# Patient Record
Sex: MALE | Race: ASIAN | NOT HISPANIC OR LATINO
[De-identification: names, ages, dates, MRNs, and addresses within clinical notes are randomized per-mention and may not be internally consistent; named-entity substitution may affect disease eponyms.]

---

## 2017-07-28 PROBLEM — Z00.00 ENCOUNTER FOR PREVENTIVE HEALTH EXAMINATION: Status: ACTIVE | Noted: 2017-07-28

## 2017-08-01 ENCOUNTER — APPOINTMENT (OUTPATIENT)
Dept: UROLOGY | Facility: CLINIC | Age: 82
End: 2017-08-01
Payer: MEDICARE

## 2017-08-01 VITALS
WEIGHT: 126 LBS | BODY MASS INDEX: 18.66 KG/M2 | TEMPERATURE: 97.5 F | OXYGEN SATURATION: 97 % | DIASTOLIC BLOOD PRESSURE: 62 MMHG | SYSTOLIC BLOOD PRESSURE: 151 MMHG | HEART RATE: 58 BPM | HEIGHT: 69 IN

## 2017-08-01 DIAGNOSIS — Z87.19 PERSONAL HISTORY OF OTHER DISEASES OF THE DIGESTIVE SYSTEM: ICD-10-CM

## 2017-08-01 DIAGNOSIS — Z86.39 PERSONAL HISTORY OF OTHER ENDOCRINE, NUTRITIONAL AND METABOLIC DISEASE: ICD-10-CM

## 2017-08-01 DIAGNOSIS — Z86.79 PERSONAL HISTORY OF OTHER DISEASES OF THE CIRCULATORY SYSTEM: ICD-10-CM

## 2017-08-01 DIAGNOSIS — Z78.9 OTHER SPECIFIED HEALTH STATUS: ICD-10-CM

## 2017-08-01 DIAGNOSIS — I10 ESSENTIAL (PRIMARY) HYPERTENSION: ICD-10-CM

## 2017-08-01 PROCEDURE — 99204 OFFICE O/P NEW MOD 45 MIN: CPT

## 2017-08-01 RX ORDER — TAMSULOSIN HYDROCHLORIDE 0.4 MG/1
CAPSULE ORAL
Refills: 0 | Status: ACTIVE | COMMUNITY

## 2017-08-01 RX ORDER — SENNOSIDES 8.6 MG/1
TABLET ORAL
Refills: 0 | Status: ACTIVE | COMMUNITY

## 2017-08-01 RX ORDER — MULTIVIT-MIN/IRON FUM/FOLIC AC 7.5 MG-4
TABLET ORAL
Refills: 0 | Status: ACTIVE | COMMUNITY

## 2017-08-01 RX ORDER — METOPROLOL TARTRATE 75 MG/1
TABLET, FILM COATED ORAL
Refills: 0 | Status: ACTIVE | COMMUNITY

## 2017-08-01 RX ORDER — PRAVASTATIN SODIUM 80 MG/1
TABLET ORAL
Refills: 0 | Status: ACTIVE | COMMUNITY

## 2017-08-01 RX ORDER — AMLODIPINE BESYLATE 5 MG/1
TABLET ORAL
Refills: 0 | Status: ACTIVE | COMMUNITY

## 2017-08-01 RX ORDER — FINASTERIDE 1 MG/1
TABLET ORAL
Refills: 0 | Status: ACTIVE | COMMUNITY

## 2017-08-01 RX ORDER — LOSARTAN POTASSIUM 100 MG/1
TABLET, FILM COATED ORAL
Refills: 0 | Status: ACTIVE | COMMUNITY

## 2017-08-03 ENCOUNTER — APPOINTMENT (OUTPATIENT)
Age: 82
End: 2017-08-03

## 2017-08-03 LAB
APPEARANCE: CLEAR
BACTERIA: NEGATIVE
BILIRUBIN URINE: NEGATIVE
BLOOD URINE: ABNORMAL
COLOR: YELLOW
GLUCOSE QUALITATIVE U: NORMAL MG/DL
HYALINE CASTS: 0 /LPF
KETONES URINE: NEGATIVE
LEUKOCYTE ESTERASE URINE: NEGATIVE
MICROSCOPIC-UA: NORMAL
NITRITE URINE: NEGATIVE
PH URINE: 6.5
PROTEIN URINE: NEGATIVE MG/DL
RED BLOOD CELLS URINE: 10 /HPF
SPECIFIC GRAVITY URINE: 1.02
SQUAMOUS EPITHELIAL CELLS: 0 /HPF
UROBILINOGEN URINE: 1 MG/DL
WHITE BLOOD CELLS URINE: 0 /HPF

## 2017-08-07 ENCOUNTER — APPOINTMENT (OUTPATIENT)
Dept: UROLOGY | Facility: CLINIC | Age: 82
End: 2017-08-07
Payer: MEDICARE

## 2017-08-07 VITALS
DIASTOLIC BLOOD PRESSURE: 69 MMHG | HEART RATE: 72 BPM | RESPIRATION RATE: 17 BRPM | TEMPERATURE: 97.6 F | WEIGHT: 128 LBS | BODY MASS INDEX: 18.9 KG/M2 | SYSTOLIC BLOOD PRESSURE: 154 MMHG | OXYGEN SATURATION: 99 %

## 2017-08-07 PROCEDURE — 52000 CYSTOURETHROSCOPY: CPT

## 2018-03-19 ENCOUNTER — APPOINTMENT (OUTPATIENT)
Dept: UROLOGY | Facility: CLINIC | Age: 83
End: 2018-03-19
Payer: MEDICARE

## 2018-03-19 VITALS
DIASTOLIC BLOOD PRESSURE: 79 MMHG | RESPIRATION RATE: 18 BRPM | BODY MASS INDEX: 19.2 KG/M2 | SYSTOLIC BLOOD PRESSURE: 144 MMHG | WEIGHT: 130 LBS | HEART RATE: 67 BPM | OXYGEN SATURATION: 98 %

## 2018-03-19 PROCEDURE — 99213 OFFICE O/P EST LOW 20 MIN: CPT

## 2018-03-20 ENCOUNTER — APPOINTMENT (OUTPATIENT)
Age: 83
End: 2018-03-20

## 2018-03-20 LAB
APPEARANCE: CLEAR
BACTERIA: NEGATIVE
BILIRUBIN URINE: NEGATIVE
BLOOD URINE: NEGATIVE
CALCIUM OXALATE CRYSTALS: ABNORMAL
COLOR: YELLOW
GLUCOSE QUALITATIVE U: NEGATIVE MG/DL
HYALINE CASTS: 0 /LPF
KETONES URINE: NEGATIVE
LEUKOCYTE ESTERASE URINE: NEGATIVE
MICROSCOPIC-UA: NORMAL
NITRITE URINE: NEGATIVE
PH URINE: 5.5
PROTEIN URINE: NEGATIVE MG/DL
RED BLOOD CELLS URINE: 3 /HPF
SPECIFIC GRAVITY URINE: 1.03
SQUAMOUS EPITHELIAL CELLS: 1 /HPF
UROBILINOGEN URINE: NEGATIVE MG/DL
WHITE BLOOD CELLS URINE: 1 /HPF

## 2018-03-29 ENCOUNTER — APPOINTMENT (OUTPATIENT)
Dept: UROLOGY | Facility: CLINIC | Age: 83
End: 2018-03-29
Payer: MEDICARE

## 2018-03-29 PROCEDURE — 76775 US EXAM ABDO BACK WALL LIM: CPT

## 2019-03-28 ENCOUNTER — APPOINTMENT (OUTPATIENT)
Dept: UROLOGY | Facility: CLINIC | Age: 84
End: 2019-03-28
Payer: MEDICARE

## 2019-03-28 ENCOUNTER — APPOINTMENT (OUTPATIENT)
Dept: UROLOGY | Facility: CLINIC | Age: 84
End: 2019-03-28

## 2019-03-28 PROCEDURE — 76775 US EXAM ABDO BACK WALL LIM: CPT

## 2019-03-28 PROCEDURE — 99213 OFFICE O/P EST LOW 20 MIN: CPT | Mod: 25

## 2019-03-28 NOTE — ASSESSMENT
[FreeTextEntry1] : Patient is a 85 yo M who presents with microhematuria. 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  New small punctate R kidney stones.\par D/w pt and son that for small punctate asymptomatic stones would observe at this time.  Increase fluid intake and avoid salt, protein, oxalate.\par F/u 1 yr for repeat sono

## 2019-03-28 NOTE — HISTORY OF PRESENT ILLNESS
[FreeTextEntry1] : Patient is a 85 yo M who presents for f/u of microhematuria.  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  He has nocturia x2.  AUA SS 2 bother 0.  He denies dysuria, gross hematuria, urgency, frequency on his BPH meds.  He notes rare urge incontinence, leaks a few drops. No use of pads.  No flank pain.  No fever/chills.\par \par Here for f/u today.  Doing well.  No pain.  No hematuria.  Renal sono showed stable L AML, two small punctate R renal stones.

## 2019-10-07 ENCOUNTER — MEDICATION RENEWAL (OUTPATIENT)
Age: 84
End: 2019-10-07

## 2020-02-03 ENCOUNTER — APPOINTMENT (OUTPATIENT)
Dept: UROLOGY | Facility: CLINIC | Age: 85
End: 2020-02-03
Payer: MEDICARE

## 2020-02-03 VITALS
OXYGEN SATURATION: 96 % | SYSTOLIC BLOOD PRESSURE: 105 MMHG | HEART RATE: 59 BPM | BODY MASS INDEX: 18.16 KG/M2 | DIASTOLIC BLOOD PRESSURE: 65 MMHG | RESPIRATION RATE: 18 BRPM | WEIGHT: 123 LBS | TEMPERATURE: 98 F

## 2020-02-03 PROCEDURE — 99213 OFFICE O/P EST LOW 20 MIN: CPT | Mod: 25

## 2020-02-03 PROCEDURE — 76775 US EXAM ABDO BACK WALL LIM: CPT

## 2020-02-03 NOTE — ASSESSMENT
[FreeTextEntry1] : Patient is a 85 yo M who presents for f/u microhematuria. Stable BPH/LUTS on combination medication\par Prior renal ULT with stable AML and punctate stones\par \par -renal sono today \par -reassured pt that given cystoscopy in 2017, no need at this time for repeat cystoscopy\par -f/u 1 yr

## 2020-02-03 NOTE — PHYSICAL EXAM
[General Appearance - Well Developed] : well developed [Well Groomed] : well groomed [Normal Appearance] : normal appearance [General Appearance - Well Nourished] : well nourished [General Appearance - In No Acute Distress] : no acute distress [Abdomen Soft] : soft [Abdomen Tenderness] : non-tender [Costovertebral Angle Tenderness] : no ~M costovertebral angle tenderness [] : no respiratory distress [Respiration, Rhythm And Depth] : normal respiratory rhythm and effort [Exaggerated Use Of Accessory Muscles For Inspiration] : no accessory muscle use

## 2020-02-03 NOTE — HISTORY OF PRESENT ILLNESS
[FreeTextEntry1] : Patient is a 85 yo M who presents for f/u of microhematuria.  He had negative hematuria w/u with cystoscopy and renal sono showed small 7mm L AML in 2017.\par \par More recently he had annual labs with PCP - UA showed 2-5 rbcs, microhematuria.  He is doing stably, no new complaints.  He does have baseline LUTS/BPH, takes flomax, finasteride.  He has nocturia x2.  AUA SS 2 bother 0.  He denies dysuria, gross hematuria, urgency, frequency on his BPH meds.  He notes rare urge incontinence, leaks a few drops. No use of pads.  No flank pain.  No fever/chills.\par \par Prior renal sono showed stable L AML, two small punctate R renal stones in 3/2019.

## 2020-03-30 ENCOUNTER — APPOINTMENT (OUTPATIENT)
Dept: UROLOGY | Facility: CLINIC | Age: 85
End: 2020-03-30

## 2021-02-04 ENCOUNTER — APPOINTMENT (OUTPATIENT)
Dept: UROLOGY | Facility: CLINIC | Age: 86
End: 2021-02-04

## 2021-02-11 ENCOUNTER — APPOINTMENT (OUTPATIENT)
Dept: UROLOGY | Facility: CLINIC | Age: 86
End: 2021-02-11

## 2021-03-08 ENCOUNTER — APPOINTMENT (OUTPATIENT)
Dept: UROLOGY | Facility: CLINIC | Age: 86
End: 2021-03-08
Payer: MEDICARE

## 2021-03-08 VITALS — TEMPERATURE: 97.3 F

## 2021-03-08 PROCEDURE — 99072 ADDL SUPL MATRL&STAF TM PHE: CPT

## 2021-03-08 PROCEDURE — 76775 US EXAM ABDO BACK WALL LIM: CPT

## 2021-11-11 DIAGNOSIS — N02.7: ICD-10-CM

## 2021-11-11 RX ORDER — RIVAROXABAN 20 MG/1
20 TABLET, FILM COATED ORAL
Refills: 0 | Status: ACTIVE | COMMUNITY
Start: 2021-11-11

## 2021-11-11 RX ORDER — WARFARIN SODIUM 6 MG/1
TABLET ORAL
Refills: 0 | Status: DISCONTINUED | COMMUNITY
End: 2021-11-11

## 2021-11-15 ENCOUNTER — APPOINTMENT (OUTPATIENT)
Dept: UROLOGY | Facility: CLINIC | Age: 86
End: 2021-11-15

## 2021-11-15 ENCOUNTER — APPOINTMENT (OUTPATIENT)
Dept: UROLOGY | Facility: CLINIC | Age: 86
End: 2021-11-15
Payer: MEDICARE

## 2021-11-15 VITALS
RESPIRATION RATE: 18 BRPM | SYSTOLIC BLOOD PRESSURE: 107 MMHG | HEART RATE: 85 BPM | OXYGEN SATURATION: 98 % | WEIGHT: 110 LBS | BODY MASS INDEX: 16.24 KG/M2 | DIASTOLIC BLOOD PRESSURE: 66 MMHG | TEMPERATURE: 97.6 F

## 2021-11-15 LAB
APPEARANCE: ABNORMAL
BACTERIA UR CULT: NORMAL
BACTERIA: NEGATIVE
BILIRUBIN URINE: ABNORMAL
BLOOD URINE: ABNORMAL
CALCIUM OXALATE CRYSTALS: ABNORMAL
COLOR: ABNORMAL
GLUCOSE QUALITATIVE U: ABNORMAL
KETONES URINE: ABNORMAL
LEUKOCYTE ESTERASE URINE: ABNORMAL
MICROSCOPIC-UA: NORMAL
NITRITE URINE: ABNORMAL
PH URINE: ABNORMAL
PROTEIN URINE: ABNORMAL
RED BLOOD CELLS URINE: >720 /HPF
SPECIFIC GRAVITY URINE: ABNORMAL
SQUAMOUS EPITHELIAL CELLS: 0 /HPF
UROBILINOGEN URINE: ABNORMAL
WHITE BLOOD CELLS URINE: 5 /HPF

## 2021-11-15 PROCEDURE — 52000 CYSTOURETHROSCOPY: CPT

## 2021-11-16 ENCOUNTER — APPOINTMENT (OUTPATIENT)
Age: 86
End: 2021-11-16

## 2021-11-16 LAB — BACTERIA UR CULT: NORMAL

## 2021-11-22 ENCOUNTER — APPOINTMENT (OUTPATIENT)
Age: 86
End: 2021-11-22

## 2021-11-22 LAB — URINE CYTOLOGY: NORMAL

## 2021-12-06 ENCOUNTER — NON-APPOINTMENT (OUTPATIENT)
Age: 86
End: 2021-12-06

## 2022-01-07 ENCOUNTER — OUTPATIENT (OUTPATIENT)
Dept: OUTPATIENT SERVICES | Facility: HOSPITAL | Age: 87
LOS: 1 days | End: 2022-01-07
Payer: COMMERCIAL

## 2022-01-07 VITALS
TEMPERATURE: 97 F | DIASTOLIC BLOOD PRESSURE: 64 MMHG | SYSTOLIC BLOOD PRESSURE: 99 MMHG | HEART RATE: 67 BPM | WEIGHT: 108.91 LBS | HEIGHT: 65.75 IN | OXYGEN SATURATION: 98 % | RESPIRATION RATE: 18 BRPM

## 2022-01-07 DIAGNOSIS — Z95.0 PRESENCE OF CARDIAC PACEMAKER: Chronic | ICD-10-CM

## 2022-01-07 DIAGNOSIS — Z98.890 OTHER SPECIFIED POSTPROCEDURAL STATES: Chronic | ICD-10-CM

## 2022-01-07 DIAGNOSIS — I10 ESSENTIAL (PRIMARY) HYPERTENSION: ICD-10-CM

## 2022-01-07 DIAGNOSIS — I50.9 HEART FAILURE, UNSPECIFIED: ICD-10-CM

## 2022-01-07 DIAGNOSIS — Z01.818 ENCOUNTER FOR OTHER PREPROCEDURAL EXAMINATION: ICD-10-CM

## 2022-01-07 DIAGNOSIS — Z90.89 ACQUIRED ABSENCE OF OTHER ORGANS: Chronic | ICD-10-CM

## 2022-01-07 DIAGNOSIS — R31.9 HEMATURIA, UNSPECIFIED: ICD-10-CM

## 2022-01-07 DIAGNOSIS — I48.91 UNSPECIFIED ATRIAL FIBRILLATION: ICD-10-CM

## 2022-01-07 LAB
ANION GAP SERPL CALC-SCNC: 12 MMOL/L — SIGNIFICANT CHANGE UP (ref 5–17)
BUN SERPL-MCNC: 22 MG/DL — SIGNIFICANT CHANGE UP (ref 7–23)
CALCIUM SERPL-MCNC: 9.1 MG/DL — SIGNIFICANT CHANGE UP (ref 8.4–10.5)
CHLORIDE SERPL-SCNC: 105 MMOL/L — SIGNIFICANT CHANGE UP (ref 96–108)
CO2 SERPL-SCNC: 22 MMOL/L — SIGNIFICANT CHANGE UP (ref 22–31)
CREAT SERPL-MCNC: 1.08 MG/DL — SIGNIFICANT CHANGE UP (ref 0.5–1.3)
GLUCOSE SERPL-MCNC: 118 MG/DL — HIGH (ref 70–99)
HCT VFR BLD CALC: 32 % — LOW (ref 39–50)
HGB BLD-MCNC: 10 G/DL — LOW (ref 13–17)
MCHC RBC-ENTMCNC: 28.7 PG — SIGNIFICANT CHANGE UP (ref 27–34)
MCHC RBC-ENTMCNC: 31.3 GM/DL — LOW (ref 32–36)
MCV RBC AUTO: 91.7 FL — SIGNIFICANT CHANGE UP (ref 80–100)
NRBC # BLD: 0 /100 WBCS — SIGNIFICANT CHANGE UP (ref 0–0)
PLATELET # BLD AUTO: 168 K/UL — SIGNIFICANT CHANGE UP (ref 150–400)
POTASSIUM SERPL-MCNC: 4 MMOL/L — SIGNIFICANT CHANGE UP (ref 3.5–5.3)
POTASSIUM SERPL-SCNC: 4 MMOL/L — SIGNIFICANT CHANGE UP (ref 3.5–5.3)
RBC # BLD: 3.49 M/UL — LOW (ref 4.2–5.8)
RBC # FLD: 14.6 % — HIGH (ref 10.3–14.5)
SODIUM SERPL-SCNC: 139 MMOL/L — SIGNIFICANT CHANGE UP (ref 135–145)
WBC # BLD: 4.43 K/UL — SIGNIFICANT CHANGE UP (ref 3.8–10.5)
WBC # FLD AUTO: 4.43 K/UL — SIGNIFICANT CHANGE UP (ref 3.8–10.5)

## 2022-01-07 PROCEDURE — 85027 COMPLETE CBC AUTOMATED: CPT

## 2022-01-07 PROCEDURE — 87186 SC STD MICRODIL/AGAR DIL: CPT

## 2022-01-07 PROCEDURE — 80048 BASIC METABOLIC PNL TOTAL CA: CPT

## 2022-01-07 PROCEDURE — G0463: CPT

## 2022-01-07 PROCEDURE — 87077 CULTURE AEROBIC IDENTIFY: CPT

## 2022-01-07 PROCEDURE — 87086 URINE CULTURE/COLONY COUNT: CPT

## 2022-01-07 RX ORDER — LIDOCAINE HCL 20 MG/ML
0.2 VIAL (ML) INJECTION ONCE
Refills: 0 | Status: DISCONTINUED | OUTPATIENT
Start: 2022-02-02 | End: 2022-02-17

## 2022-01-07 RX ORDER — CEFAZOLIN SODIUM 1 G
2000 VIAL (EA) INJECTION ONCE
Refills: 0 | Status: DISCONTINUED | OUTPATIENT
Start: 2022-02-02 | End: 2022-02-17

## 2022-01-07 RX ORDER — SACUBITRIL AND VALSARTAN 24; 26 MG/1; MG/1
1 TABLET, FILM COATED ORAL
Qty: 0 | Refills: 0 | DISCHARGE

## 2022-01-07 RX ORDER — SODIUM CHLORIDE 9 MG/ML
3 INJECTION INTRAMUSCULAR; INTRAVENOUS; SUBCUTANEOUS EVERY 8 HOURS
Refills: 0 | Status: DISCONTINUED | OUTPATIENT
Start: 2022-02-02 | End: 2022-02-17

## 2022-01-07 NOTE — H&P PST ADULT - PROBLEM SELECTOR PLAN 2
take Entresto with sip of water in the am of the surgery   ECHO and cardiac clearance to be done on 1/09/22 to be faxed to PST

## 2022-01-07 NOTE — H&P PST ADULT - PROBLEM SELECTOR PLAN 1
for cystoscopy and TURBT  CBC BMP urine culture sent to lab  preprocedure instructions given to patient and son   cardiac clearance to be done on 1/9 Sunday with ECHO  PCP medical clearance on chart   COVID swab on 1/9 sunday

## 2022-01-07 NOTE — H&P PST ADULT - PSYCHIATRIC COMMENTS
son states patient is confused but difficult to tell as  phone does not help, has been on aricept in the past but not now

## 2022-01-07 NOTE — H&P PST ADULT - NSICDXPASTMEDICALHX_GEN_ALL_CORE_FT
PAST MEDICAL HISTORY:  Angiomyolipoma of kidney     Atrial fibrillation     BEP (benign enlargement of prostate)     GIB (gastrointestinal bleeding) hx of due to hemmorhoids    Hematuria     History of subdural hematoma s/p evacuation    Hyperlipidemia     Hypertension     Mitral regurgitation s/p annuloplasty    Right nephrolithiasis     Tonsil cancer s/p tonsillectomy, chemo, radiation

## 2022-01-07 NOTE — H&P PST ADULT - ADDITIONAL PE
Son present during the interview as patient speaks Cantonese only and is very [place] and a poor historian needs assistance from son for safety

## 2022-01-07 NOTE — H&P PST ADULT - PROBLEM SELECTOR PLAN 3
hold Xarelto 3 days prior to surgery  spoke with Dr Mcnamara cardiologist who is agreeable to holding Xarelto for 3 days prior to surgery, last dose 1/8/22  take propanolol on am of procedure with sip of water  last Pacemaker interrogation July 13, 2021  [employer] [id number] with 4 years battery life copy on chart

## 2022-01-07 NOTE — H&P PST ADULT - NSICDXPASTSURGICALHX_GEN_ALL_CORE_FT
PAST SURGICAL HISTORY:  Cardiac pacemaker first implant was MDT s/p gen change  BSC SCHUYLER    S/P tonsillectomy     Status post mitral valve annuloplasty

## 2022-01-07 NOTE — H&P PST ADULT - HISTORY OF PRESENT ILLNESS
HTN, HLD, AFIB, Mitral valve disease, s/p MV annuloplasty, Afib s/p single chamber [employer] ppm, s/p subdural hematoma tonsil cancer 2019, s/p tonsillectomy chemotherapy radiation no teeth, GIB hemorrhoids  hx of exploratory lap,  angiomyolipoma of the kidney, BPH with nocturia, right kidney nephrolithiasis,  This is a elderly frail 87 year old [facility] Cantonese speaking male with a past medical history HTN, HLD, Mitral regurgitation s/p MV annuloplasty, Afib on Xarelto s/p [place] scientific PPM  2014, HF unknown EF son states it has improved to normal on entresto, s/p subdural hematoma s/p evacuation 2017, tonsil cancer 2019, s/p tonsillectomy chemotherapy radiation no teeth, GIB hemorrhoids 2018.   angiomyolipoma of the kidney, BPH with nocturia, right kidney nephrolithiasis. Patient with history of ongoing hematuria, imaging with bladder lesion and thickening noted present to PST prior to cystoscopy and transurethral resection of bladder tumor on 1/13/22. denies fever cough, malaise body aches, dizziness, HA, palpitations, chest pain , SOB GILLIS, abdominal pain, N/V/D, states hematuria has stopped.   COVID swab due on 1/9/22 11:30 am This is a elderly frail 87 year old [facility] Cantonese speaking male with a past medical history HTN, HLD, Mitral regurgitation s/p MV annuloplasty, Afib on Xarelto s/p [place] scientific PPM  2014, HF unknown EF son states it has improved to normal on entresto, s/p subdural hematoma s/p evacuation 2017, tonsil cancer 2019, s/p tonsillectomy chemotherapy radiation no teeth, GIB hemorrhoids 2018.   angiomyolipoma of the kidney, BPH with nocturia, right kidney nephrolithiasis. Patient with history of ongoing hematuria, imaging with bladder lesion and thickening noted present to PST prior to cystoscopy and transurethral resection of bladder tumor on 1/13/22. denies fever cough, malaise body aches, dizziness, HA, palpitations, chest pain , SOB GILLIS, abdominal pain, N/V/D, states hematuria has stopped.   COVID swab due on 1/9/22 11:30 am    ****Procedure was canceled due to positive urine cx, patient was treated, rescheduled for 2/2/2022 ROGER Rachel NP *** This is a elderly frail 87 year old [facility] Cantonese speaking male with a past medical history HTN, HLD, Mitral regurgitation s/p MV annuloplasty, Afib on Xarelto s/p [place] scientific PPM  2014, HF unknown EF son states it has improved to normal on entresto, s/p subdural hematoma s/p evacuation 2017, tonsil cancer 2019, s/p tonsillectomy chemotherapy radiation no teeth, GIB hemorrhoids 2018.   angiomyolipoma of the kidney, BPH with nocturia, right kidney nephrolithiasis. Patient with history of ongoing hematuria, imaging with bladder lesion and thickening noted present to PST prior to cystoscopy and transurethral resection of bladder tumor on 1/13/22. denies fever cough, malaise body aches, dizziness, HA, palpitations, chest pain , SOB GILLIS, abdominal pain, N/V/D, states hematuria has stopped.   COVID swab due on 1/9/22 11:30 am    ****Procedure was canceled due to positive urine cx, patient was treated, rescheduled for 2/2/2022 ROGER Rachel NP ***     As per Dr. Potter   " TURBT should be done in the main OR given his mod-severe valvular disease with poor functional status/ worsening SOB, pacemaker dependence, dementia and possible difficult airway given his h/o tonsillar cancer with radiation. " Case re directed 1/31/2022 ROGER Rachel NP  This is a elderly frail 87 year old [facility] Cantonese speaking male with a past medical history HTN, HLD, Mitral regurgitation s/p MV annuloplasty, Afib on Xarelto s/p [place] scientific PPM  2014, HF unknown EF son states it has improved to normal on entresto, s/p subdural hematoma s/p evacuation 2017, tonsil cancer 2019, s/p tonsillectomy chemotherapy radiation no teeth, GIB hemorrhoids 2018.   angiomyolipoma of the kidney, BPH with nocturia, right kidney nephrolithiasis. Patient with history of ongoing hematuria, imaging with bladder lesion and thickening noted present to PST prior to cystoscopy and transurethral resection of bladder tumor on 1/13/22. denies fever cough, malaise body aches, dizziness, HA, palpitations, chest pain , SOB GILLIS, abdominal pain, N/V/D, states hematuria has stopped.   COVID swab due on 1/9/22 11:30 am    ****Procedure was canceled due to positive urine cx, patient was treated, rescheduled for 2/2/2022 ROGER Rachel NP ***     As per Dr. Potter   " TURBT should be done in the main OR given his mod-severe valvular disease with poor functional status/ worsening SOB, pacemaker dependence, dementia and possible difficult airway given his h/o tonsillar cancer with radiation. " Case re directed, team and surgeon notified via email  1/31/2022 ROGER Rachel NP

## 2022-01-07 NOTE — H&P PST ADULT - FALL HARM RISK - HARM RISK INTERVENTIONS
Assistance with ambulation/Assistance OOB with selected safe patient handling equipment/Communicate Risk of Fall with Harm to all staff/Discuss with provider need for PT consult/Monitor gait and stability/Provide patient with walking aids - walker, cane, crutches/Reinforce activity limits and safety measures with patient and family/Tailored Fall Risk Interventions/Use of alarms - bed, chair and/or voice tab/Visual Cue: Yellow wristband and red socks/Bed in lowest position, wheels locked, appropriate side rails in place/Call bell, personal items and telephone in reach/Instruct patient to call for assistance before getting out of bed or chair/Non-slip footwear when patient is out of bed/[place] to call system/Physically safe environment - no spills, clutter or unnecessary equipment/Purposeful Proactive Rounding/Room/bathroom lighting operational, light cord in reach

## 2022-01-09 ENCOUNTER — OUTPATIENT (OUTPATIENT)
Dept: OUTPATIENT SERVICES | Facility: HOSPITAL | Age: 87
LOS: 1 days | End: 2022-01-09
Payer: COMMERCIAL

## 2022-01-09 DIAGNOSIS — Z98.890 OTHER SPECIFIED POSTPROCEDURAL STATES: Chronic | ICD-10-CM

## 2022-01-09 DIAGNOSIS — Z90.89 ACQUIRED ABSENCE OF OTHER ORGANS: Chronic | ICD-10-CM

## 2022-01-09 DIAGNOSIS — Z11.52 ENCOUNTER FOR SCREENING FOR COVID-19: ICD-10-CM

## 2022-01-09 DIAGNOSIS — Z95.0 PRESENCE OF CARDIAC PACEMAKER: Chronic | ICD-10-CM

## 2022-01-09 PROCEDURE — U0003: CPT

## 2022-01-09 PROCEDURE — C9803: CPT

## 2022-01-09 PROCEDURE — U0005: CPT

## 2022-01-10 LAB
-  AMIKACIN: SIGNIFICANT CHANGE UP
-  AMOXICILLIN/CLAVULANIC ACID: SIGNIFICANT CHANGE UP
-  AMPICILLIN/SULBACTAM: SIGNIFICANT CHANGE UP
-  AMPICILLIN: SIGNIFICANT CHANGE UP
-  AZTREONAM: SIGNIFICANT CHANGE UP
-  CEFAZOLIN: SIGNIFICANT CHANGE UP
-  CEFEPIME: SIGNIFICANT CHANGE UP
-  CEFOXITIN: SIGNIFICANT CHANGE UP
-  CEFTRIAXONE: SIGNIFICANT CHANGE UP
-  CIPROFLOXACIN: SIGNIFICANT CHANGE UP
-  ERTAPENEM: SIGNIFICANT CHANGE UP
-  GENTAMICIN: SIGNIFICANT CHANGE UP
-  IMIPENEM: SIGNIFICANT CHANGE UP
-  LEVOFLOXACIN: SIGNIFICANT CHANGE UP
-  MEROPENEM: SIGNIFICANT CHANGE UP
-  NITROFURANTOIN: SIGNIFICANT CHANGE UP
-  PIPERACILLIN/TAZOBACTAM: SIGNIFICANT CHANGE UP
-  TIGECYCLINE: SIGNIFICANT CHANGE UP
-  TOBRAMYCIN: SIGNIFICANT CHANGE UP
-  TRIMETHOPRIM/SULFAMETHOXAZOLE: SIGNIFICANT CHANGE UP
CULTURE RESULTS: SIGNIFICANT CHANGE UP
METHOD TYPE: SIGNIFICANT CHANGE UP
ORGANISM # SPEC MICROSCOPIC CNT: SIGNIFICANT CHANGE UP
ORGANISM # SPEC MICROSCOPIC CNT: SIGNIFICANT CHANGE UP
SARS-COV-2 RNA SPEC QL NAA+PROBE: SIGNIFICANT CHANGE UP
SPECIMEN SOURCE: SIGNIFICANT CHANGE UP

## 2022-01-11 PROBLEM — N20.0 CALCULUS OF KIDNEY: Chronic | Status: ACTIVE | Noted: 2022-01-07

## 2022-01-11 PROBLEM — N40.0 BENIGN PROSTATIC HYPERPLASIA WITHOUT LOWER URINARY TRACT SYMPTOMS: Chronic | Status: ACTIVE | Noted: 2022-01-07

## 2022-01-11 PROBLEM — R31.9 HEMATURIA, UNSPECIFIED: Chronic | Status: ACTIVE | Noted: 2022-01-07

## 2022-01-11 PROBLEM — K92.2 GASTROINTESTINAL HEMORRHAGE, UNSPECIFIED: Chronic | Status: ACTIVE | Noted: 2022-01-07

## 2022-01-11 PROBLEM — C09.9 MALIGNANT NEOPLASM OF TONSIL, UNSPECIFIED: Chronic | Status: ACTIVE | Noted: 2022-01-07

## 2022-01-11 PROBLEM — D17.71 BENIGN LIPOMATOUS NEOPLASM OF KIDNEY: Chronic | Status: ACTIVE | Noted: 2022-01-07

## 2022-01-11 PROBLEM — I34.0 NONRHEUMATIC MITRAL (VALVE) INSUFFICIENCY: Chronic | Status: ACTIVE | Noted: 2022-01-07

## 2022-01-11 PROBLEM — I10 ESSENTIAL (PRIMARY) HYPERTENSION: Chronic | Status: ACTIVE | Noted: 2022-01-07

## 2022-01-11 PROBLEM — E78.5 HYPERLIPIDEMIA, UNSPECIFIED: Chronic | Status: ACTIVE | Noted: 2022-01-07

## 2022-01-11 PROBLEM — I48.91 UNSPECIFIED ATRIAL FIBRILLATION: Chronic | Status: ACTIVE | Noted: 2022-01-07

## 2022-01-11 PROBLEM — Z86.79 PERSONAL HISTORY OF OTHER DISEASES OF THE CIRCULATORY SYSTEM: Chronic | Status: ACTIVE | Noted: 2022-01-07

## 2022-01-11 RX ORDER — SULFAMETHOXAZOLE AND TRIMETHOPRIM 800; 160 MG/1; MG/1
800-160 TABLET ORAL TWICE DAILY
Qty: 14 | Refills: 0 | Status: ACTIVE | COMMUNITY
Start: 2022-01-11 | End: 1900-01-01

## 2022-01-20 ENCOUNTER — APPOINTMENT (OUTPATIENT)
Dept: UROLOGY | Facility: CLINIC | Age: 87
End: 2022-01-20
Payer: MEDICARE

## 2022-01-20 VITALS
TEMPERATURE: 97.4 F | WEIGHT: 108 LBS | RESPIRATION RATE: 16 BRPM | DIASTOLIC BLOOD PRESSURE: 52 MMHG | BODY MASS INDEX: 15.95 KG/M2 | OXYGEN SATURATION: 98 % | HEART RATE: 80 BPM | SYSTOLIC BLOOD PRESSURE: 84 MMHG

## 2022-01-20 PROCEDURE — 99213 OFFICE O/P EST LOW 20 MIN: CPT

## 2022-01-20 NOTE — ASSESSMENT
[FreeTextEntry1] : Patient is a 85 yo M who presents with hematuria and bladder lesion. Stable BPH/LUTS on combination medication\par \par Previously d/w pt and family observation of bladder tumor given age and co-morbidities.  He wishes to undergo TURBT.\par OR scheduled for 2/2\par He will hold xarelto according to PST recommendations 3 days prior.\par Cardiac eval/clearance in chart

## 2022-01-21 ENCOUNTER — APPOINTMENT (OUTPATIENT)
Age: 87
End: 2022-01-21

## 2022-01-21 LAB
APPEARANCE: CLEAR
BACTERIA UR CULT: NORMAL
BILIRUBIN URINE: NEGATIVE
BLOOD URINE: NEGATIVE
COLOR: YELLOW
GLUCOSE QUALITATIVE U: NEGATIVE
KETONES URINE: NEGATIVE
LEUKOCYTE ESTERASE URINE: NEGATIVE
NITRITE URINE: NEGATIVE
PH URINE: 6
PROTEIN URINE: NEGATIVE
SPECIFIC GRAVITY URINE: 1.02
UROBILINOGEN URINE: NORMAL

## 2022-01-30 ENCOUNTER — OUTPATIENT (OUTPATIENT)
Dept: OUTPATIENT SERVICES | Facility: HOSPITAL | Age: 87
LOS: 1 days | End: 2022-01-30
Payer: COMMERCIAL

## 2022-01-30 DIAGNOSIS — Z11.52 ENCOUNTER FOR SCREENING FOR COVID-19: ICD-10-CM

## 2022-01-30 DIAGNOSIS — Z98.890 OTHER SPECIFIED POSTPROCEDURAL STATES: Chronic | ICD-10-CM

## 2022-01-30 DIAGNOSIS — Z90.89 ACQUIRED ABSENCE OF OTHER ORGANS: Chronic | ICD-10-CM

## 2022-01-30 DIAGNOSIS — Z95.0 PRESENCE OF CARDIAC PACEMAKER: Chronic | ICD-10-CM

## 2022-01-30 LAB — SARS-COV-2 RNA SPEC QL NAA+PROBE: SIGNIFICANT CHANGE UP

## 2022-01-30 PROCEDURE — U0003: CPT

## 2022-01-30 PROCEDURE — C9803: CPT

## 2022-01-30 PROCEDURE — U0005: CPT

## 2022-02-01 ENCOUNTER — TRANSCRIPTION ENCOUNTER (OUTPATIENT)
Age: 87
End: 2022-02-01

## 2022-02-02 ENCOUNTER — APPOINTMENT (OUTPATIENT)
Dept: UROLOGY | Facility: HOSPITAL | Age: 87
End: 2022-02-02

## 2022-02-02 ENCOUNTER — OUTPATIENT (OUTPATIENT)
Dept: OUTPATIENT SERVICES | Facility: HOSPITAL | Age: 87
LOS: 1 days | End: 2022-02-02
Payer: COMMERCIAL

## 2022-02-02 ENCOUNTER — RESULT REVIEW (OUTPATIENT)
Age: 87
End: 2022-02-02

## 2022-02-02 VITALS
DIASTOLIC BLOOD PRESSURE: 66 MMHG | OXYGEN SATURATION: 97 % | SYSTOLIC BLOOD PRESSURE: 119 MMHG | WEIGHT: 108.91 LBS | HEART RATE: 61 BPM | HEIGHT: 65.75 IN | TEMPERATURE: 97 F | RESPIRATION RATE: 16 BRPM

## 2022-02-02 VITALS
SYSTOLIC BLOOD PRESSURE: 150 MMHG | DIASTOLIC BLOOD PRESSURE: 80 MMHG | TEMPERATURE: 97 F | OXYGEN SATURATION: 100 % | RESPIRATION RATE: 17 BRPM | HEART RATE: 77 BPM

## 2022-02-02 DIAGNOSIS — R31.9 HEMATURIA, UNSPECIFIED: ICD-10-CM

## 2022-02-02 DIAGNOSIS — Z95.0 PRESENCE OF CARDIAC PACEMAKER: Chronic | ICD-10-CM

## 2022-02-02 DIAGNOSIS — Z90.89 ACQUIRED ABSENCE OF OTHER ORGANS: Chronic | ICD-10-CM

## 2022-02-02 DIAGNOSIS — Z98.890 OTHER SPECIFIED POSTPROCEDURAL STATES: Chronic | ICD-10-CM

## 2022-02-02 PROCEDURE — 88305 TISSUE EXAM BY PATHOLOGIST: CPT

## 2022-02-02 PROCEDURE — 88305 TISSUE EXAM BY PATHOLOGIST: CPT | Mod: 26

## 2022-02-02 PROCEDURE — 52204 CYSTOSCOPY W/BIOPSY(S): CPT

## 2022-02-02 PROCEDURE — C9399: CPT

## 2022-02-02 PROCEDURE — 52234 CYSTOSCOPY AND TREATMENT: CPT

## 2022-02-02 RX ORDER — TAMSULOSIN HYDROCHLORIDE 0.4 MG/1
1 CAPSULE ORAL
Qty: 0 | Refills: 0 | DISCHARGE

## 2022-02-02 RX ORDER — FINASTERIDE 5 MG/1
1 TABLET, FILM COATED ORAL
Qty: 0 | Refills: 0 | DISCHARGE

## 2022-02-02 RX ORDER — RIVAROXABAN 15 MG-20MG
1 KIT ORAL
Qty: 0 | Refills: 0 | DISCHARGE

## 2022-02-02 RX ORDER — SACUBITRIL AND VALSARTAN 24; 26 MG/1; MG/1
1 TABLET, FILM COATED ORAL
Qty: 0 | Refills: 0 | DISCHARGE

## 2022-02-02 RX ORDER — PHENAZOPYRIDINE HCL 100 MG
1 TABLET ORAL
Qty: 15 | Refills: 0
Start: 2022-02-02 | End: 2022-02-06

## 2022-02-02 RX ORDER — SENNA PLUS 8.6 MG/1
3 TABLET ORAL
Qty: 0 | Refills: 0 | DISCHARGE

## 2022-02-02 RX ORDER — PROPRANOLOL HCL 160 MG
1 CAPSULE, EXTENDED RELEASE 24HR ORAL
Qty: 0 | Refills: 0 | DISCHARGE

## 2022-02-02 NOTE — PRE-ANESTHESIA EVALUATION ADULT - NSANTHOSAYNRD_GEN_A_CORE
No. MILE screening performed.  STOP BANG Legend: 0-2 = LOW Risk; 3-4 = INTERMEDIATE Risk; 5-8 = HIGH Risk

## 2022-02-02 NOTE — PRE-ANESTHESIA EVALUATION ADULT - NSANTHADDINFOFT_GEN_ALL_CORE
Chart reviewed, including medical and cardiac workup. Informed consent obtained, including all R/B/A. Afib s/p single lead PPM- interrogated 12/21 w/ good battery life, not pacer dependent. HFpEF, moderate to severe MR, asymptomatic.

## 2022-02-02 NOTE — PRE-ANESTHESIA EVALUATION ADULT - NSANTHVITALSIGNSFT_GEN_ALL_CORE
Vital Signs Last 24 Hrs  T(C): 36.2 (02 Feb 2022 14:50), Max: 36.2 (02 Feb 2022 14:50)  T(F): 97.2 (02 Feb 2022 14:50), Max: 97.2 (02 Feb 2022 14:50)  HR: 61 (02 Feb 2022 14:50) (61 - 61)  BP: 119/66 (02 Feb 2022 14:50) (119/66 - 119/66)  BP(mean): --  RR: 16 (02 Feb 2022 14:50) (16 - 16)  SpO2: 97% (02 Feb 2022 14:50) (97% - 97%)

## 2022-02-02 NOTE — PRE-ANESTHESIA EVALUATION ADULT - NSANTHPMHFT_GEN_ALL_CORE
87 M w/ HTN, HLD, MR s/p MV annuloplasty, Afib on Xarelto s/p [email or web address] PPM 2014, HFpEF Entresto, s/p subdural hematoma s/p evacuation 2017, tonsil cancer 2019, s/p tonsillectomy chemotherapy radiation, no teeth, GIB hemorrhoids 2018, angiomyolipoma of the kidney, BPH with nocturia, right kidney nephrolithiasis

## 2022-02-02 NOTE — ASU DISCHARGE PLAN (ADULT/PEDIATRIC) - ASU DC SPECIAL INSTRUCTIONSFT
Discharge Instructions: TURP/TURBT    •	Catheter: Some patients are sent home with a davies catheter while others go home urinating on their own. If you still have a catheter, the nurses will review instructions and care before you go home.  •	General: It is common to have blood in the urine after your procedure. It may be pink or even red; inform your doctor if you have a significant amount of clot in the urine or if you are unable to void at all or if your catheter stops draining. It is not uncommon to have some burning when you urinate, this will gradually improve. With a catheter in place, it is not uncommon to have occasional leakage of urine or blood around the catheter. Please call your urologist if this is excessive and/or the urine is not draining through the catheter into the bag.  •	Bathing: You may shower or bathe. If going home with Davies, shower only until catheter is removed.  •	Diet: You may resume your regular diet and regular medication regimen.  •	Pain: You may take Tylenol (acetaminophen) 650-975mg and/or Motrin (ibuprofen) 400-600mg, available over the counter, for pain every 6 hours as needed. Do not exceed 4000 milligrams of Tylenol (acetaminophen) daily. You may alternate these medications such that you take either one every 3 hours.  •	Antibiotics: You may be given a prescription for an antibiotic, please take this medication as instructed and be sure to complete entire course.  •	Stool softeners: Do not allow yourself to become constipated as straining will cause bleeding. Take stool softeners (ex. Colace) or a laxative (ex. Senekot, ExLax), available over the counter, if needed.  •	Activity: No heavy lifting or strenuous exercise until you are evaluated at your post-operative appointment. Otherwise, you may return to your usual level of activity.  •	Anticoagulation: If you are taking any blood thinning medications, please discuss with your urologist prior to restarting these medications unless otherwise specified.  •	Follow-up: If you did not already schedule your post-operative appointment, please call your urologist to schedule a follow-up appointment.  •	Call your urologist if: You have any bleeding that does not stop, inability to void >8 hours, fever over 100.4 F, chills, persistent nausea/vomiting, or if your pain is not controlled on your discharge pain medications. Discharge Instructions: TURP/TURBT    •	Catheter: Some patients are sent home with a davies catheter while others go home urinating on their own. If you still have a catheter, the nurses will review instructions and care before you go home.  •	General: It is common to have blood in the urine after your procedure. It may be pink or even red; inform your doctor if you have a significant amount of clot in the urine or if you are unable to void at all or if your catheter stops draining. It is not uncommon to have some burning when you urinate, this will gradually improve. With a catheter in place, it is not uncommon to have occasional leakage of urine or blood around the catheter. Please call your urologist if this is excessive and/or the urine is not draining through the catheter into the bag.  •	Bathing: You may shower or bathe. If going home with Davies, shower only until catheter is removed.  •	Diet: You may resume your regular diet and regular medication regimen.  •	Pain: You may take Tylenol (acetaminophen) 650-975mg and/or Motrin (ibuprofen) 400-600mg, available over the counter, for pain every 6 hours as needed. Do not exceed 4000 milligrams of Tylenol (acetaminophen) daily. You may alternate these medications such that you take either one every 3 hours.  •	Antibiotics: You may be given a prescription for an antibiotic, please take this medication as instructed and be sure to complete entire course.  •	Stool softeners: Do not allow yourself to become constipated as straining will cause bleeding. Take stool softeners (ex. Colace) or a laxative (ex. Senekot, ExLax), available over the counter, if needed.  •	Activity: No heavy lifting or strenuous exercise until you are evaluated at your post-operative appointment. Otherwise, you may return to your usual level of activity.  •	Anticoagulation: If you are taking any blood thinning medications, please discuss with your urologist prior to restarting these medications unless otherwise specified. HOLD XARELTO UNTIL OKAYED BY DR. TANNER  •	Follow-up: If you did not already schedule your post-operative appointment, please call your urologist to schedule a follow-up appointment.  •	Call your urologist if: You have any bleeding that does not stop, inability to void >8 hours, fever over 100.4 F, chills, persistent nausea/vomiting, or if your pain is not controlled on your discharge pain medications.

## 2022-02-02 NOTE — ASU PATIENT PROFILE, ADULT - FALL HARM RISK - HARM RISK INTERVENTIONS
Assistance with ambulation/Assistance OOB with selected safe patient handling equipment/Communicate Risk of Fall with Harm to all staff/Discuss with provider need for PT consult/Monitor gait and stability/Provide patient with walking aids - walker, cane, crutches/Reinforce activity limits and safety measures with patient and family/Tailored Fall Risk Interventions/Use of alarms - bed, chair and/or voice tab/Visual Cue: Yellow wristband and red socks/Bed in lowest position, wheels locked, appropriate side rails in place/Call bell, personal items and telephone in reach/Instruct patient to call for assistance before getting out of bed or chair/Non-slip footwear when patient is out of bed/[place] to call system/Physically safe environment - no spills, clutter or unnecessary equipment/Purposeful Proactive Rounding/Room/bathroom lighting operational, light cord in reach

## 2022-02-02 NOTE — ASU DISCHARGE PLAN (ADULT/PEDIATRIC) - NS MD DC FALL RISK RISK
For information on Fall & Injury Prevention, visit: [email or web address] OR  [email or web address] OR  https://www.cdc.gov/steadi/patient.html

## 2022-02-03 ENCOUNTER — APPOINTMENT (OUTPATIENT)
Dept: UROLOGY | Facility: CLINIC | Age: 87
End: 2022-02-03
Payer: MEDICARE

## 2022-02-03 VITALS
SYSTOLIC BLOOD PRESSURE: 104 MMHG | HEART RATE: 90 BPM | DIASTOLIC BLOOD PRESSURE: 62 MMHG | WEIGHT: 108 LBS | BODY MASS INDEX: 15.95 KG/M2 | OXYGEN SATURATION: 97 % | RESPIRATION RATE: 16 BRPM | TEMPERATURE: 98.3 F

## 2022-02-03 PROCEDURE — 99213 OFFICE O/P EST LOW 20 MIN: CPT

## 2022-02-03 NOTE — PHYSICAL EXAM
[General Appearance - Well Developed] : well developed [General Appearance - Well Nourished] : well nourished [Normal Appearance] : normal appearance [Well Groomed] : well groomed [General Appearance - In No Acute Distress] : no acute distress [FreeTextEntry1] : davies with alfred, light pink urine

## 2022-02-03 NOTE — HISTORY OF PRESENT ILLNESS
[FreeTextEntry1] : Patient is a 85 yo M who presents for f/u of hematuria.  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  He underwent cystoscopy which showed a LG appearing bladder tumor.  CT Urogram showed faint punctate R stone.  Bladder with circumferential thickening and R bladder diverticulum. .\par \par He does have baseline LUTS/BPH, takes flomax, finasteride.  He has nocturia x2.  AUA SS 2 bother 0.  He denies dysuria, gross hematuria, urgency, frequency currently.  \par \par S/p cysto bladder bx in OR 2/2/22.  Minimal hematuria postop in davies. No flank pain.  No fever/chills.\par \par \par

## 2022-02-03 NOTE — ASSESSMENT
[FreeTextEntry1] : Patient is a 85 yo M who presents with bladder lesion s/p cysto/bx. Stable BPH/LUTS on combination medication\par \par TOV today - passed successfully\par Voided clear pink urine\par PVR 117cc\par Restart xarelto in 2 days\par F/u 1 wk for symptom check and path report

## 2022-02-08 LAB — SURGICAL PATHOLOGY STUDY: SIGNIFICANT CHANGE UP

## 2022-02-10 ENCOUNTER — LABORATORY RESULT (OUTPATIENT)
Age: 87
End: 2022-02-10

## 2022-02-10 ENCOUNTER — APPOINTMENT (OUTPATIENT)
Dept: UROLOGY | Facility: CLINIC | Age: 87
End: 2022-02-10
Payer: MEDICARE

## 2022-02-10 VITALS
BODY MASS INDEX: 16.54 KG/M2 | SYSTOLIC BLOOD PRESSURE: 90 MMHG | OXYGEN SATURATION: 97 % | TEMPERATURE: 97.8 F | HEART RATE: 70 BPM | DIASTOLIC BLOOD PRESSURE: 54 MMHG | WEIGHT: 112 LBS | RESPIRATION RATE: 16 BRPM

## 2022-02-10 DIAGNOSIS — D41.4 NEOPLASM OF UNCERTAIN BEHAVIOR OF BLADDER: ICD-10-CM

## 2022-02-10 PROCEDURE — 99213 OFFICE O/P EST LOW 20 MIN: CPT

## 2022-02-10 NOTE — ASSESSMENT
[FreeTextEntry1] : Patient is a 87 yo M who presents for f/u of hematuria.  Bladder biopsy showed benign bladder tumor.\par \par -for hematuria postop, will hold xarelto for 5 more days\par -cont bph meds\par -pvr today is stable 120cc\par -f/u 3-4 wks

## 2022-02-10 NOTE — PHYSICAL EXAM
[General Appearance - Well Developed] : well developed [General Appearance - Well Nourished] : well nourished [Normal Appearance] : normal appearance [Well Groomed] : well groomed [General Appearance - In No Acute Distress] : no acute distress [Urethral Meatus] : meatus normal [Urinary Bladder Findings] : the bladder was normal on palpation [Normal Station and Gait] : the gait and station were normal for the patient's age

## 2022-02-10 NOTE — HISTORY OF PRESENT ILLNESS
[FreeTextEntry1] : Patient is a 85 yo M who presents for f/u of hematuria.  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  He underwent cystoscopy which showed a LG appearing bladder tumor.  CT Urogram showed faint punctate R stone.  Bladder with circumferential thickening and R bladder diverticulum. .\par \par He does have baseline LUTS/BPH, takes flomax, finasteride.  He has nocturia x2.  AUA SS 2 bother 0.  He denies dysuria, gross hematuria, urgency, frequency currently.  \par \par S/p cysto bladder bx in OR 2/2/22.  Pathology was benign. No flank pain.  No fever/chills.\par Passed TOV, he has been voiding but after restarting his xarelto 4 days ago his urine became progressively bloody.  No significant clots.  No significant pain.  He did take some tylenol a few days ago.\par \par \par

## 2022-02-15 ENCOUNTER — NON-APPOINTMENT (OUTPATIENT)
Age: 87
End: 2022-02-15

## 2022-02-22 ENCOUNTER — APPOINTMENT (OUTPATIENT)
Age: 87
End: 2022-02-22

## 2022-02-22 LAB
APPEARANCE: ABNORMAL
BACTERIA UR CULT: NORMAL
BILIRUBIN URINE: NEGATIVE
BLOOD URINE: ABNORMAL
COLOR: ABNORMAL
GLUCOSE QUALITATIVE U: NEGATIVE
KETONES URINE: NEGATIVE
LEUKOCYTE ESTERASE URINE: NEGATIVE
NITRITE URINE: NEGATIVE
PH URINE: 7
PROTEIN URINE: ABNORMAL
SPECIFIC GRAVITY URINE: >=1.03
UROBILINOGEN URINE: NORMAL

## 2022-03-03 ENCOUNTER — APPOINTMENT (OUTPATIENT)
Dept: UROLOGY | Facility: CLINIC | Age: 87
End: 2022-03-03
Payer: MEDICARE

## 2022-03-03 VITALS
TEMPERATURE: 96.9 F | OXYGEN SATURATION: 98 % | BODY MASS INDEX: 16.39 KG/M2 | DIASTOLIC BLOOD PRESSURE: 45 MMHG | RESPIRATION RATE: 16 BRPM | HEART RATE: 65 BPM | SYSTOLIC BLOOD PRESSURE: 76 MMHG | WEIGHT: 111 LBS

## 2022-03-03 DIAGNOSIS — R31.9 HEMATURIA, UNSPECIFIED: ICD-10-CM

## 2022-03-03 PROCEDURE — 99213 OFFICE O/P EST LOW 20 MIN: CPT

## 2022-03-03 NOTE — HISTORY OF PRESENT ILLNESS
[FreeTextEntry1] : Patient is a 85 yo M who presents for f/u of hematuria.  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  He underwent cystoscopy which showed a LG appearing bladder tumor.  CT Urogram showed faint punctate R stone.  Bladder with circumferential thickening and R bladder diverticulum. .\par \par He does have baseline LUTS/BPH, takes flomax, finasteride.  He has nocturia x2.  AUA SS 2 bother 0.  He denies dysuria, gross hematuria, urgency, frequency currently.  \par \par S/p cysto bladder bx in OR 2/2/22.  Pathology was benign. No flank pain.  No fever/chills.\par Passed TOV, he has been voiding but after restarting his xarelto 4 days ago his urine became progressively bloody.  No significant clots.  No significant pain.  He did take some tylenol a few days ago.\par He held xarelto and then has restarted for past couple of weeks.  He has had no hematuria for past couple weeks and reports no dysuria, good urination.  Currently no hematuria.\par Main issue has been low BP, feeling tired and dizziness since surgery.\par He has baseline low BP and dizziness preop, but feeling more tired since.\par

## 2022-03-03 NOTE — ASSESSMENT
[FreeTextEntry1] : Patient is a 87 yo M who presents for f/u of hematuria.\par Had benign bladder lesion on OR biopsy.\par Currently no hematuria and voiding well.\par But main issue is low BP and feeling tired/LH.\par Will check BMP, CBC today\par D/w pt and wife to see Cardiologist given low BP, d/w pt that should consider holding one of his 2 BP meds\par Encouraged fluid hydration\par PVR 0cc\par f/u 3 mos

## 2022-03-03 NOTE — PHYSICAL EXAM
[General Appearance - Well Developed] : well developed [General Appearance - Well Nourished] : well nourished [Normal Appearance] : normal appearance [Well Groomed] : well groomed [General Appearance - In No Acute Distress] : no acute distress [] : no respiratory distress [Respiration, Rhythm And Depth] : normal respiratory rhythm and effort [Exaggerated Use Of Accessory Muscles For Inspiration] : no accessory muscle use [Affect] : the affect was normal [Mood] : the mood was normal [Not Anxious] : not anxious [Normal Station and Gait] : the gait and station were normal for the patient's age

## 2022-03-04 LAB
ANION GAP SERPL CALC-SCNC: 9 MMOL/L
BASOPHILS # BLD AUTO: 0.04 K/UL
BASOPHILS NFR BLD AUTO: 1.1 %
BUN SERPL-MCNC: 26 MG/DL
CALCIUM SERPL-MCNC: 8.9 MG/DL
CHLORIDE SERPL-SCNC: 105 MMOL/L
CO2 SERPL-SCNC: 24 MMOL/L
CREAT SERPL-MCNC: 1.03 MG/DL
EGFR: 71 ML/MIN/1.73M2
EOSINOPHIL # BLD AUTO: 0.06 K/UL
EOSINOPHIL NFR BLD AUTO: 1.6 %
GLUCOSE SERPL-MCNC: 132 MG/DL
HCT VFR BLD CALC: 23.5 %
HGB BLD-MCNC: 7 G/DL
IMM GRANULOCYTES NFR BLD AUTO: 0.3 %
LYMPHOCYTES # BLD AUTO: 0.44 K/UL
LYMPHOCYTES NFR BLD AUTO: 11.7 %
MAN DIFF?: NORMAL
MCHC RBC-ENTMCNC: 26.6 PG
MCHC RBC-ENTMCNC: 29.8 GM/DL
MCV RBC AUTO: 89.4 FL
MONOCYTES # BLD AUTO: 0.37 K/UL
MONOCYTES NFR BLD AUTO: 9.9 %
NEUTROPHILS # BLD AUTO: 2.83 K/UL
NEUTROPHILS NFR BLD AUTO: 75.4 %
PLATELET # BLD AUTO: 161 K/UL
POTASSIUM SERPL-SCNC: 4 MMOL/L
RBC # BLD: 2.63 M/UL
RBC # FLD: 16.6 %
SODIUM SERPL-SCNC: 138 MMOL/L
WBC # FLD AUTO: 3.75 K/UL

## 2022-03-10 ENCOUNTER — APPOINTMENT (OUTPATIENT)
Dept: UROLOGY | Facility: CLINIC | Age: 87
End: 2022-03-10

## 2022-08-22 NOTE — HISTORY OF PRESENT ILLNESS
[FreeTextEntry1] : Patient is a 87 yo M who presents for f/u of hematuria.  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  He underwent cystoscopy which showed a LG appearing bladder tumor.  CT Urogram showed faint punctate R stone.  Bladder with circumferential thickening and R bladder diverticulum. .\par \par  He does have baseline LUTS/BPH, takes flomax, finasteride.  He has nocturia x2.  AUA SS 2 bother 0.  He denies dysuria, gross hematuria, urgency, frequency currently.  He was scheduled for OR 1/12/22 but preop ucx + and treated with abx.  He is here for repeat urine cx prior to OR 2/2/22.  No flank pain.  No fever/chills.\par \par No LH, CP, or SOB today.\par  Include Pregnancy/Lactation Warning?: No

## 2022-09-12 ENCOUNTER — APPOINTMENT (OUTPATIENT)
Dept: UROLOGY | Facility: CLINIC | Age: 87
End: 2022-09-12

## 2023-11-30 ENCOUNTER — APPOINTMENT (OUTPATIENT)
Dept: UROLOGY | Facility: CLINIC | Age: 88
End: 2023-11-30
Payer: MEDICARE

## 2023-11-30 VITALS
HEART RATE: 81 BPM | WEIGHT: 113.8 LBS | RESPIRATION RATE: 18 BRPM | BODY MASS INDEX: 16.81 KG/M2 | TEMPERATURE: 97.6 F | OXYGEN SATURATION: 98 % | SYSTOLIC BLOOD PRESSURE: 105 MMHG | DIASTOLIC BLOOD PRESSURE: 68 MMHG

## 2023-11-30 DIAGNOSIS — N20.0 CALCULUS OF KIDNEY: ICD-10-CM

## 2023-11-30 DIAGNOSIS — D17.71 BENIGN LIPOMATOUS NEOPLASM OF KIDNEY: ICD-10-CM

## 2023-11-30 DIAGNOSIS — R35.1 BENIGN PROSTATIC HYPERPLASIA WITH LOWER URINARY TRACT SYMPMS: ICD-10-CM

## 2023-11-30 DIAGNOSIS — N40.1 BENIGN PROSTATIC HYPERPLASIA WITH LOWER URINARY TRACT SYMPMS: ICD-10-CM

## 2023-11-30 DIAGNOSIS — R31.29 OTHER MICROSCOPIC HEMATURIA: ICD-10-CM

## 2023-11-30 PROCEDURE — 99213 OFFICE O/P EST LOW 20 MIN: CPT

## 2023-11-30 PROCEDURE — 76775 US EXAM ABDO BACK WALL LIM: CPT

## 2023-11-30 RX ORDER — FINASTERIDE 5 MG/1
5 TABLET, FILM COATED ORAL
Qty: 90 | Refills: 3 | Status: ACTIVE | COMMUNITY
Start: 2017-08-07 | End: 1900-01-01

## 2023-12-04 RX ORDER — TAMSULOSIN HYDROCHLORIDE 0.4 MG/1
0.4 CAPSULE ORAL
Qty: 90 | Refills: 3 | Status: ACTIVE | COMMUNITY
Start: 2017-08-07 | End: 1900-01-01

## 2024-11-25 ENCOUNTER — APPOINTMENT (OUTPATIENT)
Dept: UROLOGY | Facility: CLINIC | Age: 89
End: 2024-11-25

## 2025-06-02 ENCOUNTER — APPOINTMENT (OUTPATIENT)
Dept: UROLOGY | Facility: CLINIC | Age: 89
End: 2025-06-02
Payer: MEDICARE

## 2025-06-02 VITALS
DIASTOLIC BLOOD PRESSURE: 65 MMHG | OXYGEN SATURATION: 98 % | HEART RATE: 80 BPM | TEMPERATURE: 98.1 F | WEIGHT: 113 LBS | RESPIRATION RATE: 18 BRPM | BODY MASS INDEX: 16.69 KG/M2 | SYSTOLIC BLOOD PRESSURE: 93 MMHG

## 2025-06-02 DIAGNOSIS — D17.71 BENIGN LIPOMATOUS NEOPLASM OF KIDNEY: ICD-10-CM

## 2025-06-02 DIAGNOSIS — N40.1 BENIGN PROSTATIC HYPERPLASIA WITH LOWER URINARY TRACT SYMPMS: ICD-10-CM

## 2025-06-02 DIAGNOSIS — R35.1 BENIGN PROSTATIC HYPERPLASIA WITH LOWER URINARY TRACT SYMPMS: ICD-10-CM

## 2025-06-02 DIAGNOSIS — D41.4 NEOPLASM OF UNCERTAIN BEHAVIOR OF BLADDER: ICD-10-CM

## 2025-06-02 PROCEDURE — 99213 OFFICE O/P EST LOW 20 MIN: CPT

## 2025-06-02 PROCEDURE — G2211 COMPLEX E/M VISIT ADD ON: CPT

## 2025-07-07 ENCOUNTER — APPOINTMENT (OUTPATIENT)
Dept: UROLOGY | Facility: CLINIC | Age: 89
End: 2025-07-07

## 2025-07-13 NOTE — PHYSICAL EXAM
Problem: PROBLEM: INCREASED SUICIDAL IDEATIONS WITH INTENT  Goal: Therapy STG: Engage in coping skills group as indicated by \"cooperative\" and/or \"engaged\" behaviors in 4 consecutive groups  Outcome: Monitoring/Evaluating progress  Note: 7/13/2025 1:36 PM   Goal Progression: Outcome Not Met - Continue to monitor.  Goal initiated. Joan GARCÍA, SAC          [General Appearance - Well Developed] : well developed [General Appearance - Well Nourished] : well nourished [Normal Appearance] : normal appearance [Well Groomed] : well groomed [General Appearance - In No Acute Distress] : no acute distress [] : no respiratory distress [Respiration, Rhythm And Depth] : normal respiratory rhythm and effort [Exaggerated Use Of Accessory Muscles For Inspiration] : no accessory muscle use [Oriented To Time, Place, And Person] : oriented to person, place, and time [Affect] : the affect was normal [Mood] : the mood was normal [Not Anxious] : not anxious

## (undated) DEVICE — BAG URINE W METER 2L

## (undated) DEVICE — FOLEY HOLDER STATLOCK 2 WAY ADULT

## (undated) DEVICE — GOWN TRIMAX LG

## (undated) DEVICE — SYR LUER LOK 30CC

## (undated) DEVICE — ELCTR BUGBEE FULGATING 5FR X 58CM

## (undated) DEVICE — ELCTR PLASMA BUTTON OVAL 24FR 12-30 DEG

## (undated) DEVICE — SOL IRR BAG NS 0.9% 3000ML

## (undated) DEVICE — CABLE DAC ACTIVE CORD

## (undated) DEVICE — PACK CYSTO

## (undated) DEVICE — ELCTR PLASMA LOOP MEDIUM ANGLED 24FR 12-30 DEG

## (undated) DEVICE — GLV 7.5 PROTEXIS (WHITE)

## (undated) DEVICE — TUBING RANGER FLUID IRRIGATION SET DISP

## (undated) DEVICE — POSITIONER FOAM EGG CRATE ULNAR 2PCS (PINK)

## (undated) DEVICE — SOL IRR BAG H2O 3000ML

## (undated) DEVICE — VENODYNE/SCD SLEEVE CALF LARGE

## (undated) DEVICE — IRRIGATION TRAY W PISTON SYRINGE 60ML

## (undated) DEVICE — ACMI SELF-SEALING SEAL UP TO 7FR

## (undated) DEVICE — WARMING BLANKET UPPER ADULT

## (undated) DEVICE — ELCTR PLASMA LOOP MEDIUM 24FR 12-16 DEG